# Patient Record
Sex: FEMALE | Race: BLACK OR AFRICAN AMERICAN | NOT HISPANIC OR LATINO | Employment: UNEMPLOYED | URBAN - METROPOLITAN AREA
[De-identification: names, ages, dates, MRNs, and addresses within clinical notes are randomized per-mention and may not be internally consistent; named-entity substitution may affect disease eponyms.]

---

## 2019-12-05 ENCOUNTER — TELEPHONE (OUTPATIENT)
Dept: OBGYN CLINIC | Facility: HOSPITAL | Age: 44
End: 2019-12-05

## 2019-12-05 NOTE — TELEPHONE ENCOUNTER
Patient has an appointment to see Dr Leslee Sommers from [facility], [address] is calling to see if the Referral for the patient was received in the office  She stated she faxed it two times to the [facility] office  She would like a callback to verify that the office received it      CB: [phone]

## 2019-12-13 ENCOUNTER — APPOINTMENT (OUTPATIENT)
Dept: RADIOLOGY | Facility: CLINIC | Age: 44
End: 2019-12-13
Payer: COMMERCIAL

## 2019-12-13 ENCOUNTER — OFFICE VISIT (OUTPATIENT)
Dept: OBGYN CLINIC | Facility: CLINIC | Age: 44
End: 2019-12-13
Payer: COMMERCIAL

## 2019-12-13 VITALS
DIASTOLIC BLOOD PRESSURE: 74 MMHG | WEIGHT: 278 LBS | BODY MASS INDEX: 41.18 KG/M2 | HEIGHT: 69 IN | SYSTOLIC BLOOD PRESSURE: 115 MMHG

## 2019-12-13 DIAGNOSIS — M25.562 LEFT KNEE PAIN, UNSPECIFIED CHRONICITY: Primary | ICD-10-CM

## 2019-12-13 DIAGNOSIS — S76.112A STRAIN OF LEFT QUADRICEPS TENDON, INITIAL ENCOUNTER: ICD-10-CM

## 2019-12-13 DIAGNOSIS — M25.562 LEFT KNEE PAIN, UNSPECIFIED CHRONICITY: ICD-10-CM

## 2019-12-13 PROCEDURE — 99203 OFFICE O/P NEW LOW 30 MIN: CPT | Performed by: ORTHOPAEDIC SURGERY

## 2019-12-13 PROCEDURE — 73562 X-RAY EXAM OF KNEE 3: CPT

## 2019-12-13 RX ORDER — NAPROXEN 500 MG/1
TABLET ORAL
Refills: 0 | COMMUNITY
Start: 2019-12-04

## 2019-12-13 NOTE — PROGRESS NOTES
Assessment/Plan:  1  Left knee pain, unspecified chronicity  XR knee 3 vw left non injury    MRI knee left  wo contrast   2  Strain of left quadriceps tendon, initial encounter  MRI knee left  wo contrast       Scribe Attestation    I,:   Soneder Mar Call am acting as a scribe while in the presence of the attending physician :        I,:   Millie Velasco MD personally performed the services described in this documentation    as scribed in my presence :              Franck Maurice has a significant amount of tenderness as well as a small deformity at the quadriceps tendon which makes me highly suspicious of a quadriceps tendon rupture  She is very weak with knee extension as well  I feel an MRI of the left knee questioning quadriceps tendon rupture is warranted  Our office staff will help her schedule that today  Once the MRI has been completed in report provided by Radiology she will return to see me  Should a tear be found this may require a surgical repair  She was fitted with a T ROM brace today to support the knee in full extension  She can use ice for pain and swelling control  I did provide her a note requesting frequent breaks from standing as this exacerbates her symptoms  Subjective:   Luis Johnson is a 53 y o  female who presents to the office today for evaluation of her left knee  She was referred to us by her primary care physician at the [facility]  She states she developed knee pain approximately 1 month ago that was insidious in onset  Her pain is described as a persistent moderate ache that can be come sharp and severe when standing or walking  Previously, Gisele's sleeping arrangements require to sleep on a top bunk  This required her to use a step ladder to get into her bunk  She is unsure if this caused the injury  Franck Maurice also participates in a choir  She must stand for an hour at a time    Following a practice session her knee swelled significantly and she was evaluated in the ED and one subsequent occasions due to an increase in the swelling  She states the swelling persist and is very frustrated with her current state as she is not walking well  She describes walking as dragging her leg behind her  She is unable to ascend or descend stairs normally and must take 1 step at a time  Any attempt to flex the left knee will cause an increase in her pain as well  She is better at rest and while in a seated position  Review of Systems   Constitutional: Positive for activity change  Negative for chills, fever and unexpected weight change  HENT: Negative for hearing loss, nosebleeds and sore throat  Eyes: Negative for pain, redness and visual disturbance  Respiratory: Negative for cough, shortness of breath and wheezing  Cardiovascular: Negative for chest pain, palpitations and leg swelling  Gastrointestinal: Negative for abdominal pain, nausea and vomiting  Endocrine: Negative for polydipsia and polyuria  Genitourinary: Negative for dysuria and hematuria  Musculoskeletal:        See HPI   Skin: Negative for rash and wound  Neurological: Negative for dizziness, numbness and headaches  Psychiatric/Behavioral: Negative for decreased concentration and suicidal ideas  The patient is not nervous/anxious  History reviewed  No pertinent past medical history  History reviewed  No pertinent surgical history      Family History   Problem Relation Age of Onset    Diabetes Mother     Hypertension Mother     Diabetes Father     Diabetes Sister     Diabetes Brother        Social History     Occupational History    Not on file   Tobacco Use    Smoking status: Never Smoker    Smokeless tobacco: Never Used   Substance and Sexual Activity    Alcohol use: Never     Frequency: Never    Drug use: Never    Sexual activity: Not on file         Current Outpatient Medications:     naproxen (NAPROSYN) 500 mg tablet, TAKE 1 TABLET BY MOUTH EVERY 12 HOURS WITH FOOD OR MILK AS NEEDED FOR 30 DAYS, Disp: , Rfl: 0    No Known Allergies    Objective: There were no vitals filed for this visit  Left Knee Exam     Tenderness   Left knee tenderness location: quadricep tendon  Range of Motion   Extension: -5 (Painful)   Flexion: 40 (Painful)     Tests   Varus: negative Valgus: negative    Other   Sensation: normal  Swelling: moderate  Effusion: effusion present    Comments: There is a small palpable divot in the lateral aspect of the quadriceps tendon that is tender to the touch  Knee extension 3/5          Observations   Left Knee   Positive for effusion  Physical Exam   Constitutional: She is oriented to person, place, and time  She appears well-developed and well-nourished  HENT:   Head: Normocephalic and atraumatic  Eyes: Conjunctivae are normal  Right eye exhibits no discharge  Left eye exhibits no discharge  Neck: Normal range of motion  Neck supple  Cardiovascular: Regular rhythm and intact distal pulses  Pulmonary/Chest: Effort normal  No stridor  No respiratory distress  Musculoskeletal:        Left knee: She exhibits effusion  Neurological: She is alert and oriented to person, place, and time  Skin: Skin is warm and dry  Psychiatric: She has a normal mood and affect  Her behavior is normal    Vitals reviewed  I have personally reviewed pertinent films in PACS and my interpretation is as follows:  X-rays of the left knee demonstrate moderate patellofemoral osteoarthritis

## 2019-12-13 NOTE — LETTER
December 13, 2019     Patient: Yamileth Canales   YOB: 1975   Date of Visit: 12/13/2019       To Whom it May Concern:    Yamileth Canales is under my professional care  She was seen in my office on 12/13/2019  She may return to work with limitations To include frequent breaks from standing       If you have any questions or concerns, please don't hesitate to call           Sincerely,          Lesli Belle MD        CC: No Recipients

## 2019-12-30 ENCOUNTER — TELEPHONE (OUTPATIENT)
Dept: OBGYN CLINIC | Facility: CLINIC | Age: 44
End: 2019-12-30

## 2020-01-02 ENCOUNTER — TELEPHONE (OUTPATIENT)
Dept: OBGYN CLINIC | Facility: CLINIC | Age: 45
End: 2020-01-02

## 2020-01-02 NOTE — TELEPHONE ENCOUNTER
Max Suarez from [facility] called inquiring about authorization for patient's MRI  Mayte Kelly per last notes that the clinical notes were submited and still pending as of today   Max Suarez requested if we could call the [facility] with any updates of approval/denial      C/b [phone]

## 2020-01-06 NOTE — TELEPHONE ENCOUNTER
Marissa Whittington called from [facility] [place] to check status of auth for Hero E[facility] MRI which is scheduled for 1/10/20  Please call her [phone]    Thank you

## 2020-01-07 NOTE — TELEPHONE ENCOUNTER
Called center, gave information to , left my name and phone number, dr Willis Thompson office, mri authorized for Friday 1/10/20

## 2020-01-10 ENCOUNTER — HOSPITAL ENCOUNTER (OUTPATIENT)
Dept: RADIOLOGY | Facility: HOSPITAL | Age: 45
Discharge: HOME | End: 2020-01-10
Attending: ORTHOPAEDIC SURGERY
Payer: COMMERCIAL

## 2020-01-10 DIAGNOSIS — M25.562 LEFT KNEE PAIN, UNSPECIFIED CHRONICITY: ICD-10-CM

## 2020-01-10 DIAGNOSIS — S76.112A STRAIN OF LEFT QUADRICEPS TENDON, INITIAL ENCOUNTER: ICD-10-CM

## 2020-01-10 PROCEDURE — 73721 MRI JNT OF LWR EXTRE W/O DYE: CPT

## 2020-01-17 ENCOUNTER — TELEPHONE (OUTPATIENT)
Dept: OBGYN CLINIC | Facility: CLINIC | Age: 45
End: 2020-01-17

## 2020-01-17 NOTE — TELEPHONE ENCOUNTER
Patient called into the office today because she had an MRI done on 1/10/2020  She is looking for the results of the MRI   Please advise  Thank You!

## 2020-02-21 ENCOUNTER — OFFICE VISIT (OUTPATIENT)
Dept: OBGYN CLINIC | Facility: CLINIC | Age: 45
End: 2020-02-21
Payer: COMMERCIAL

## 2020-02-21 VITALS — WEIGHT: 293 LBS | BODY MASS INDEX: 43.4 KG/M2 | HEIGHT: 69 IN

## 2020-02-21 DIAGNOSIS — M17.12 PRIMARY OSTEOARTHRITIS OF LEFT KNEE: Primary | ICD-10-CM

## 2020-02-21 DIAGNOSIS — E66.01 CLASS 3 SEVERE OBESITY DUE TO EXCESS CALORIES WITH BODY MASS INDEX (BMI) OF 45.0 TO 49.9 IN ADULT, UNSPECIFIED WHETHER SERIOUS COMORBIDITY PRESENT (HCC): ICD-10-CM

## 2020-02-21 PROCEDURE — 99214 OFFICE O/P EST MOD 30 MIN: CPT | Performed by: ORTHOPAEDIC SURGERY

## 2020-02-21 PROCEDURE — 20610 DRAIN/INJ JOINT/BURSA W/O US: CPT | Performed by: ORTHOPAEDIC SURGERY

## 2020-02-21 RX ORDER — DEXAMETHASONE SODIUM PHOSPHATE 100 MG/10ML
40 INJECTION INTRAMUSCULAR; INTRAVENOUS
Status: COMPLETED | OUTPATIENT
Start: 2020-02-21 | End: 2020-02-21

## 2020-02-21 RX ORDER — LIDOCAINE HYDROCHLORIDE 10 MG/ML
4 INJECTION, SOLUTION INFILTRATION; PERINEURAL
Status: COMPLETED | OUTPATIENT
Start: 2020-02-21 | End: 2020-02-21

## 2020-02-21 RX ADMIN — DEXAMETHASONE SODIUM PHOSPHATE 40 MG: 100 INJECTION INTRAMUSCULAR; INTRAVENOUS at 11:20

## 2020-02-21 RX ADMIN — LIDOCAINE HYDROCHLORIDE 4 ML: 10 INJECTION, SOLUTION INFILTRATION; PERINEURAL at 11:20

## 2020-02-21 NOTE — PROGRESS NOTES
Assessment/Plan:  1  Primary osteoarthritis of left knee  Large joint arthrocentesis: L knee   2  Class 3 severe obesity due to excess calories with body mass index (BMI) of 45 0 to 49 9 in adult, unspecified whether serious comorbidity present [facility])  Ambulatory referral to Weight Management       Scribe Attestation    I,:   Abdirahman Vázquez Reinaldo am acting as a scribe while in the presence of the attending physician :        I,:   Jasmyne Jiang MD personally performed the services described in this documentation    as scribed in my presence :          Large joint arthrocentesis: L knee  Date/Time: 2/21/2020 11:20 AM  Consent given by: patient  Site marked: site marked  Timeout: Immediately prior to procedure a time out was called to verify the correct patient, procedure, equipment, support staff and site/side marked as required   Supporting Documentation  Indications: pain   Procedure Details  Location: knee - L knee  Needle size: 22 G  Ultrasound guidance: no  Approach: anteromedial  Medications administered: 4 mL lidocaine 1 %; 40 mg dexamethasone 100 mg/10 mL    Patient tolerance: patient tolerated the procedure well with no immediate complications  Dressing:  Sterile dressing applied          Gisele's MRI was negative for quadriceps tendon tear  She has shown some improvement but unfortunately has continued to have pain  We discussed the progression of her arthritic changes in the knee  We also discussed treatment options including steroid injection, joint lubricating injections and physical therapy  Darleen Teixeira chose the steroid injection today  She tolerated the procedure well  I explained to her that the injection may make her somewhat more sore today and I do recommend she ice the knee  The steroid can take up to 2 weeks to take effect  We also discussed that her obesity can be contributing to her knee pain  I did provide her a referral to weight management of which she gladly accepted    I also counseled Darleen Fila on weight loss and recommended dietary changes such as avoiding sugary drinks and to increase activity levels  Subjective:   Jeanella Lesch is a 50 y o  female who presents to the office today for re-evaluation of her left knee  On last visit I was suspicious of a quadriceps tendon tear due to weakness as well as a deformity  She has had the MRI completed we will discuss those results today  Tawnylauren Stevenson states she is feeling somewhat better  She is able to move the knee more  She is noticing good days and bad days  On rainy days her knee pain will increase  She still has some difficulty standing throughout her entire choir practice  She will sit periodically to gain relief  Her chief complaint is of the intermittent ache located about the medial aspect of the left knee that will radiate superiorly and anteriorly  Prolonged walking and standing will cause an increase in her pain  This pain has been ongoing since November of 2020  She is better at rest and with the use of a heating pad  Review of Systems   Constitutional: Positive for activity change  Negative for chills, fever and unexpected weight change  HENT: Negative for hearing loss, nosebleeds and sore throat  Eyes: Negative for pain, redness and visual disturbance  Respiratory: Negative for cough, shortness of breath and wheezing  Cardiovascular: Negative for chest pain, palpitations and leg swelling  Gastrointestinal: Negative for abdominal pain, nausea and vomiting  Endocrine: Negative for polydipsia and polyuria  Genitourinary: Negative for dysuria and hematuria  Musculoskeletal:        See HPI   Skin: Negative for rash and wound  Neurological: Negative for dizziness, numbness and headaches  Psychiatric/Behavioral: Negative for decreased concentration and suicidal ideas  The patient is not nervous/anxious  History reviewed  No pertinent past medical history  History reviewed   No pertinent surgical history  Family History   Problem Relation Age of Onset    Diabetes Mother     Hypertension Mother     Diabetes Father     Diabetes Sister     Diabetes Brother        Social History     Occupational History    Not on file   Tobacco Use    Smoking status: Never Smoker    Smokeless tobacco: Never Used   Substance and Sexual Activity    Alcohol use: Never     Frequency: Never    Drug use: Never    Sexual activity: Not on file         Current Outpatient Medications:     naproxen (NAPROSYN) 500 mg tablet, TAKE 1 TABLET BY MOUTH EVERY 12 HOURS WITH FOOD OR MILK AS NEEDED FOR 30 DAYS, Disp: , Rfl: 0    No Known Allergies    Objective: There were no vitals filed for this visit  Left Knee Exam     Tenderness   The patient is experiencing tenderness in the medial joint line and lateral joint line  Range of Motion   Extension: 0   Flexion: 70     Tests   Varus: negative Valgus: negative  Drawer:  Anterior - negative     Posterior - negative    Other   Erythema: absent  Scars: absent  Swelling: mild            Physical Exam   Constitutional: She is oriented to person, place, and time  She appears well-developed and well-nourished  HENT:   Head: Normocephalic and atraumatic  Eyes: Conjunctivae are normal  Right eye exhibits no discharge  Left eye exhibits no discharge  Neck: Normal range of motion  Neck supple  Cardiovascular: Regular rhythm and intact distal pulses  Pulmonary/Chest: Effort normal  No stridor  No respiratory distress  Neurological: She is alert and oriented to person, place, and time  Skin: Skin is warm and dry  Psychiatric: She has a normal mood and affect  Her behavior is normal    Vitals reviewed  I have personally reviewed pertinent films in PACS and my interpretation is as follows:  MRI of the left knee demonstrates mild-to-moderate tricompartmental osteoarthritis changes  The quadriceps tendon is clearly intact

## 2020-05-11 ENCOUNTER — TELEPHONE (OUTPATIENT)
Dept: BARIATRICS | Facility: CLINIC | Age: 45
End: 2020-05-11